# Patient Record
Sex: FEMALE | Race: WHITE | NOT HISPANIC OR LATINO | Employment: OTHER | URBAN - METROPOLITAN AREA
[De-identification: names, ages, dates, MRNs, and addresses within clinical notes are randomized per-mention and may not be internally consistent; named-entity substitution may affect disease eponyms.]

---

## 2018-02-21 ENCOUNTER — OFFICE VISIT (OUTPATIENT)
Dept: PODIATRY | Facility: CLINIC | Age: 83
End: 2018-02-21
Payer: COMMERCIAL

## 2018-02-21 VITALS
HEIGHT: 63 IN | DIASTOLIC BLOOD PRESSURE: 70 MMHG | SYSTOLIC BLOOD PRESSURE: 116 MMHG | BODY MASS INDEX: 25.34 KG/M2 | WEIGHT: 143 LBS

## 2018-02-21 DIAGNOSIS — M20.41 HAMMER TOES OF BOTH FEET: ICD-10-CM

## 2018-02-21 DIAGNOSIS — L84 CORNS AND CALLUS: ICD-10-CM

## 2018-02-21 DIAGNOSIS — L60.0 INGROWN NAIL OF GREAT TOE OF RIGHT FOOT: ICD-10-CM

## 2018-02-21 DIAGNOSIS — M79.671 PAIN IN BOTH FEET: Primary | ICD-10-CM

## 2018-02-21 DIAGNOSIS — M79.672 PAIN IN BOTH FEET: Primary | ICD-10-CM

## 2018-02-21 DIAGNOSIS — M20.42 HAMMER TOES OF BOTH FEET: ICD-10-CM

## 2018-02-21 PROCEDURE — 99203 OFFICE O/P NEW LOW 30 MIN: CPT | Performed by: PODIATRIST

## 2018-02-21 NOTE — PATIENT INSTRUCTIONS
Thank you for choosing [facility]!    DR. HUNTER'S CLINIC LOCATIONS:   MONDAY AM - [place] TUESDAY - [place]   [address] [address] [address] [address] [place] [id number] [phone] / [phone] / [phone]       WEDNESDAY - [place] FRIDAY PM - [place]   [address]   [place] [address] [place] [address] [phone] / [phone] / [phone]       SCHEDULE SURGERY: [phone]    APPOINTMENTS: [phone]    BILLING QUESTIONS: [phone]      FOOT CARE NURSES  If you are interested in having a foot care nurse come out to your   home, please call one of these contacts for more information:  [facility]  [phone] [facility]  [phone]   [facility]  [phone]  [place] [phone]  [facility]   [facility]  [phone]  At [facility] [phone]           CALLUS / CORNS / IPKs  When there is excessive friction or pressure on the skin, the body responds by making the skin thicker to protect the deeper structures from becoming exposed. While this works well to protect the deeper structures, the thickened skin can increase pressure and pain.    CALLUS: Flat, diffuse thickening are simple calluses and they are usually caused by friction. Often these are the result of rubbing on a shoe or going barefoot.    CORNS: Calluses with a central core between the toes are called corns. These result from prominent joints on adjacent toes rubbing together. Theses are a symptom of bone malalignment and will always recur unless the underlying bones are addressed surgically.    IPKs: Calluses with a central core on the ball of the foot are usually IPKs (intractable plantar keratosis). These are caused by excessive pressure from the metatarsals, the bones that make up the ball of the foot.  Often one of these bones is too long or too prominent.  Again, these will always recur unless the underlying bone issue is addressed. There is no cure for these. They will either go away by themselves, recur, or more could develop.    ROUTINE MAINTENANCE  1. File them down with a pumice stone or callus file a couple times a week.   2. An electric callus removing device. Amope Pedi Perfect Electronic Pedicure Foot File and Callus Remover can be a good option.   3. Lotion can be applied to soften the callus. A urea based cream such as Kersal or Vanicream or thicker cream with shea butter are good options.  4. Toe spacers or toe covers can be used for corns, gel pads can be used for other lesions on the bottom of the foot.   If there is a surgical pathology noted, such as a prominent bone, often this needs to be addressed surgically to minimize recurrence. However, sometimes the lesion simply migrates to another spot after surgery, so it is not a guaranteed cure.         HAMMERTOES  Hammertoe is a contracture (bending) of one or both joints of the second, third, fourth, or fifth (little) toes. This abnormal bending can put pressure on the toe when wearing shoes, causing problems to develop.  Hammertoes usually start out as mild deformities and get progressively worse over time. In the earlier stages, hammertoes are flexible and the symptoms can often be managed with noninvasive measures. But if left untreated, hammertoes can become more rigid and will not respond to non-surgical treatment.  Because of the progressive nature of hammertoes, they should receive early attention. Hammertoes never get better without some kind of intervention.  CAUSES  The most common cause of hammertoe is a muscle/tendon imbalance. This imbalance, which leads to a bending of the toe, results from mechanical (structural) changes in the foot that occur over time in some people.  Hammertoes may be aggravated by shoes that don t fit properly. A  hammertoe may result if a toe is too long and is forced into a cramped position when a tight shoe is worn.  Occasionally, hammertoe is the result of an earlier trauma to the toe. In some people, hammertoes are inherited.  SYMPTOMS  Pain or irritation of the affected toe when wearing shoes.   Corns and calluses (a buildup of skin) on the toe, between two toes, or on the ball of the foot. Corns are caused by constant friction against the shoe. They may be soft or hard, depending upon their location.   Inflammation, redness, or a burning sensation   Contracture of the toe   In more severe cases of hammertoe, open sores may form.   DIAGNOSIS  Although hammertoes are readily apparent, to arrive at a diagnosis the foot and ankle surgeon will obtain a thorough history of your symptoms and examine your foot. During the physical examination, the doctor may attempt to reproduce your symptoms by manipulating your foot and will study the contractures of the toes. In addition, the foot and ankle surgeon may take x-rays to determine the degree of the deformities and assess any changes that may have occurred.   Hammertoes are progressive   they don t go away by themselves and usually they will get worse over time. However, not all cases are alike   some hammertoes progress more rapidly than others. Once your foot and ankle surgeon has evaluated your hammertoes, a treatment plan can be developed that is suited to your needs.  NON-SURGICAL TREATMENT  There is a variety of treatment options for hammertoe. The treatment your foot and ankle surgeon selects will depend upon the severity of your hammertoe and other factors.  A number of non-surgical measures can be undertaken:  Padding corns and calluses. Your foot and ankle surgeon can provide or prescribe pads designed to shield corns from irritation. If you want to try over-the-counter pads, avoid the medicated types. Medicated pads are generally not recommended because they may  contain a small amount of acid that can be harmful. Consult your surgeon about this option.   Changes in shoewear. Avoid shoes with pointed toes, shoes that are too short, or shoes with high heels   conditions that can force your toe against the front of the shoe. Instead, choose comfortable shoes with a deep, roomy toe box and heels no higher than two inches.   Orthotic devices. A custom orthotic device placed in your shoe may help control the muscle/tendon imbalance.   Injection therapy. Corticosteroid injections are sometimes used to ease pain and inflammation caused by hammertoe.   Medications. Oral nonsteroidal anti-inflammatory drugs (NSAIDs), such as ibuprofen, may be recommended to reduce pain and inflammation.   Splinting/strapping. Splints or small straps may be applied by the surgeon to realign the bent toe.   Exercises:   1. The Toe Tap  Stand flat on the ground in your bare feet. Raise all of your toes up off the ground as high as you can. Then starting with the little toes, slowly press them down to the ground. After the big toes are on the ground, start over by raising all of them up off the ground again. This motion is similar to tapping your fingers on a desk. Repeat this ten times.     2. Interlocking your Fingers and Toes  Cross your right foot over your knee and place the fingers of your left hand between your toes. Squeeze your toes together, pinching your fingers between them. Spread the toes apart and squeeze them together again. Repeat this ten times then do the other foot. Like most exercises, this will get easier the more you do it. If you are having a lot of difficulty with this exercise, start with just your index finger between your first and second toe, then later add your middle finger between your second and third toes, and so on until you can fit all your fingers between your toes. Do this ten times on each foot. Eventually you will be able to spread your toes apart without using  your fingers.    3. Gripping the Floor   the floor by pressing the pads of your toes (not the tips) into the floor without curling your toes. Relax and repeat this ten times. If your shoes have the proper amount of depth, you should be able to do this with shoes on.    HAMMERTOE TOE SURGERY   Hammertoe surgery is complex. The surgical procedure is an attempt to help the toe lay in a better position. Nearly every structure in the toe will be cut including the tendons, ligaments, skin and bone. Hammertoes are a complex deformity and final toe position is difficult to predict. The only sure way to position a toe is to fuse all three digital joints. That will not happen as some degree of toe motion is needed for walking. The toe may not be completely reduced as the surrounding skin and other structures may not allow the toe to return to a normal position. The tendons on adjacent toes may need to be cut at the time of the original or subsequent surgeries, as interconnections exist between the toes. The toe may drift after surgery. Stiffness may develop leading to new areas of pressure.   Future shoe choices will be critical in allowing the surgery to provide comfort. The toes will still hurt if shoes rub. The original pain may also persist as other foot problems may be contributing to the current pain. The toe may or may not be pinned in place. External pins would require complete avoidance of water on the foot for six weeks. The pin would be removed about six weeks after the surgery. Strict attention to protection is critical. The pin could get bumped or loosen resulting in early removal. Removal might be necessary before the bone heals which would negatively affect the final surgical outcome and toe allignment.     TOE COVERS/CAPS            Body Mass Index (BMI)  Many things can cause foot and ankle problems. Foot structure, activity level, foot mechanics and injuries are common causes of pain.  One very important  issue that often goes unmentioned, is body weight. Extra weight can cause increased stress on muscles, ligaments, bones and tendons.  Sometimes just a few extra pounds is all it takes to put one over her/his threshold. Without reducing that stress, it can be difficult to alleviate pain. Some people are uncomfortable addressing this issue, but we feel it is important for you to think about it. As Foot &  Ankle specialists, our job is addressing the lower extremity problem and possible causes. Regarding extra body weight, we encourage patients to discuss diet and weight management plans with their primary care doctors. It is this team approach that gives you the best opportunity for pain relief and getting you back on your feet.

## 2018-02-21 NOTE — NURSING NOTE
"Chief Complaint   Patient presents with     Foot Problems     corn between the 4th and 5th toes on both feet the right foot is worse        Initial /70  Ht 5' 3.25\" (1.607 m)  Wt 143 lb (64.9 kg)  BMI 25.13 kg/m2 Estimated body mass index is 25.13 kg/(m^2) as calculated from the following:    Height as of this encounter: 5' 3.25\" (1.607 m).    Weight as of this encounter: 143 lb (64.9 kg).  Medication Reconciliation: complete   Dipesh Ruiz MA      "

## 2018-02-21 NOTE — LETTER
2/21/2018         RE: Carrol Diane  [address]   [facility] [address]        Dear Colleague,    Thank you for referring your patient, Carrol Diane, to the [facility]. Please see a copy of my visit note below.    PATIENT HISTORY:    Carrol Diane is a 85 year old female who presents to clinic for painful calluses and ingrown nail. She notes she can not reach her feet. Pain is 6/10 especially with compression socks on. Tried a corn remover which did not help. Would like them taken care of and would like the right great toenail cut back to help with pain.     Review of Systems:  Patient denies fever, chills, rash, wound, stiffness, limping, numbness, weakness, heart burn, blood in stool, chest pain with activity, calf pain when walking, shortness of breath with activity, chronic cough, easy bleeding/bruising, excessive thirst, fatigue, depression, anxiety.  Patient admits to swelling.     PAST MEDICAL HISTORY:   Past Medical History:   Diagnosis Date     Allergic rhinitis      Anxiety disorder      Arrhythmia     atrial fibrillation     Arthritis      Chest pain      Coronary artery disease      Depression      Disc disorder of lumbar region      Diverticulosis      Dyspepsia      Gastro-oesophageal reflux disease      Glaucoma      Heart murmur     mitral valve disorder     Hemorrhoids      History of angina      [place] (hard of hearing)      Hypercholesterolemia      Hypertension      Osteopenia      Pelvic floor dysfunction      Peripheral neuropathy      Radiculopathy of lumbar region      Sinus node dysfunction (H)      Thyroid disease      Type 2 diabetes mellitus without complications (H)         PAST SURGICAL HISTORY:   Past Surgical History:   Procedure Laterality Date     APPENDECTOMY       ARTHROPLASTY HIP       C LUMBAR SPINE FUSION,ANTER APPRCH       COLONOSCOPY       HYSTERECTOMY RADICAL       PHACOEMULSIFICATION CLEAR CORNEA WITH STANDARD INTRAOCULAR LENS IMPLANT  2/13/2014     Procedure: PHACOEMULSIFICATION CLEAR CORNEA WITH STANDARD INTRAOCULAR LENS IMPLANT;   COMPLEX  LEFT PHACOEMULSIFICATION CLEAR CORNEA WITH STANDARD INTRAOCULAR LENS IMPLANT  ;  Surgeon: Walter Mauricio MD;  Location: [facility]     PHACOEMULSIFICATION CLEAR CORNEA WITH STANDARD INTRAOCULAR LENS IMPLANT  2/25/2014    Procedure: PHACOEMULSIFICATION CLEAR CORNEA WITH STANDARD INTRAOCULAR LENS IMPLANT;    COMPLEX  RIGHT PHACOEMULSIFICATION CLEAR CORNEA WITH STANDARD INTRAOCULAR LENS IMPLANT (TRYPAN, MALYUGIN RING);  Surgeon: Walter Mauricio MD;  Location: [facility]        MEDICATIONS:   Current Outpatient Prescriptions:      saccharomyces boulardii (FLORASTOR) 250 MG capsule, Take 250 mg by mouth once, Disp: , Rfl:      conjugated estrogens (PREMARIN) vaginal cream, Place 0.5 g vaginally three times a week, Disp: 30 g, Rfl: 12     Escitalopram Oxalate (LEXAPRO PO), Take 10 mg by mouth daily, Disp: , Rfl:      fluticasone (FLONASE) 50 MCG/ACT nasal spray, Spray 2 sprays into both nostrils daily, Disp: , Rfl:      Isosorbide Mononitrate (IMDUR PO), Take 60 mg by mouth daily, Disp: , Rfl:      loratadine (CLARITIN) 10 MG tablet, Take 10 mg by mouth daily, Disp: , Rfl:      NIFEdipine (PROCARDIA XL PO), Take 90 mg by mouth daily, Disp: , Rfl:      Nitroglycerin (NITROSTAT SL), Place 0.4 mg under the tongue every 5 minutes as needed, Disp: , Rfl:      Pantoprazole Sodium (PROTONIX PO), Take 40 mg by mouth daily, Disp: , Rfl:      dorzolamide-timolol (COSOPT) 2-0.5 % ophthalmic solution, Place 1 drop into both eyes 2 times daily, Disp: , Rfl:      aspirin 81 MG tablet, Take 1 tablet by mouth daily., Disp: , Rfl:      Calcium-Vitamin D (CALCIUM + D PO), Take  by mouth., Disp: , Rfl:      Cholecalciferol (VITAMIN D3) 1000 UNIT TABS, Take  by mouth., Disp: , Rfl:      hydrocortisone 2.5 % cream, Apply 0.5 inches topically 2 times daily., Disp: , Rfl:      senna (SENOKOT) 8.6 MG tablet, Take 1 tablet by mouth daily.,  "Disp: , Rfl:      tramadol (ULTRAM) 50 MG tablet, Take 50 mg by mouth every 6 hours as needed., Disp: , Rfl:      CENTRUM SILVER OR, None Entered, Disp: , Rfl:      TYLENOL ARTHRITIS PAIN OR, prn, Disp: , Rfl:      LISINOPRIL 40 MG OR TABS, 1 TABLET DAILY, Disp: , Rfl:      LOVASTATIN 40 MG OR TABS, 1 TABLET DAILY AT bedtime, Disp: , Rfl:      LEVOTHROID 75 MCG OR TABS, 1 TABLET DAILY, Disp: , Rfl:      XALATAN 0.005 % OP SOLN, 1 drop at bdtime daily, Disp: , Rfl:      GABAPENTIN 300 MG OR CAPS, 1 CAPSULE 3 TIMES DAILY, Disp: , Rfl:      ALLERGIES:    Allergies   Allergen Reactions     Coumadin [Warfarin]      Dyazide [Hydrochlorothiazide W/Triamterene]      Monistat 1 [Tioconazole]      Burning and itching     Seasonale      Verapamil         SOCIAL HISTORY:   Social History     Social History     Marital status:      Spouse name: N/A     Number of children: N/A     Years of education: N/A     Occupational History     Not on file.     Social History Main Topics     Smoking status: Never Smoker     Smokeless tobacco: Never Used     Alcohol use No      Comment: socially     Drug use: No     Sexual activity: Not on file     Other Topics Concern     Not on file     Social History Narrative     No narrative on file        FAMILY HISTORY:   Family History   Problem Relation Age of Onset     DIABETES Daughter         EXAM:Vitals: /70  Ht 5' 3.25\" (1.607 m)  Wt 143 lb (64.9 kg)  BMI 25.13 kg/m2    General appearance: Patient is alert and fully cooperative with history & exam.  No sign of distress is noted during the visit.     Psychiatric: Affect is pleasant & appropriate.  Patient appears motivated to improve health.     Respiratory: Breathing is regular & unlabored while sitting.     HEENT: Hearing is intact to spoken word.  Speech is clear.  No gross evidence of visual impairment that would impact ambulation.     Dermatologic: localized hyperkeratotic lesion to medial right 5th proximal interphalangeal " joint and lateral right 4th proximal interphalangeal joint. No open lesions or signs of infection noted. Medial border of right great toenail is incurvated. Pain on palpation.      Vascular: DP & PT pulses are intact & regular bilaterally.  edema and varicosities noted.  CFT and skin temperature is normal to both lower extremities.     Neurologic: Lower extremity sensation is intact to light touch.  No evidence of weakness or contracture in the lower extremities.  No evidence of neuropathy.     Musculoskeletal: Patient is ambulatory without assistive device or brace.  Rigid contracture of toes 2-5 with overlapping 5th toes bilateral.      ASSESSMENT:    Pain in both feet  Hammer toes of both feet  Corns and callus  Ingrown nail of great toe of right foot       PLAN:  Reviewed patient's chart in [facility]. Reviewed and discussed causes of hammertoes with patient.  Explained that this can be caused by an overpowering of muscles or by the way we walk.  Discussed conservative treatments such as orthotics, pads, shoe gear.  Explained that sometimes the flexor tendons can be cut to try and straighten the toe and reduce rubbing. This is normally done in office and patient is weight bearing in postop she for 1-2 weeks.  We also discussed surgical intervention to remove the joint and possibly fuse the toe.  Normally patient has a pin sticking out of the toe for about 6 weeks and can not get the foot wet. Patient would have to be minimal weight bearing in cam boot.      Discussed causes of keratomas.  They are due to areas of increase friction.  Hammertoes can create these as they put more pressure to the metatarsal head.  Discussed treatments such as using foot file, pumice stone, metatarsal pads, orthotics, and not walking barefoot.     She is going to use pumice stone and toe covers to pad area. Not interested in surgery.     The potential causes and nature of an ingrown toenail were discussed with the patient.  We reviewed the  natural history/prognosis of the condition and potential risks if no treatment is provided.      Treatment options discussed included conservative management (oral antibiotics, soaking of foot, adequate width shoes)  as well as surgical management (partial or total nail removal).  The pros and cons of both forms of treatment were reviewed.      After thorough discussion and answering all questions, the patient elected to to have the nail border just cut back. Did not want avulsion procedure. Was given information on where to get nail and callus care. Follow up as needed. .        Divine Duggan DPM, Podiatry/Foot and Ankle Surgery    Weight management plan: Patient was referred to their PCP to discuss a diet and exercise plan.      Again, thank you for allowing me to participate in the care of your patient.        Sincerely,        Divine Duggan DPM, Podiatry/Foot and Ankle Surgery

## 2018-02-21 NOTE — MR AVS SNAPSHOT
After Visit Summary   2/21/2018    Carrol Diane    MRN: [id number]           Patient Information     Date Of Birth          6/5/1932        Visit Information        Provider Department      2/21/2018 10:15 AM Divine Duggan DPM, Podiatry/Foot and Ankle Surgery [facility]        Care Instructions    Thank you for choosing [facility]!    DR. DUGGAN'S CLINIC LOCATIONS:   MONDAY AM - [place] TUESDAY - [place]   [address] [address] [place] [address] [place] [address]   [phone] / [phone] / [phone]       WEDNESDAY - [place] FRIDAY PM - [place]   [address]   [place] [address] [place], [place] [address] [phone] / [phone] / [phone]       SCHEDULE SURGERY: [phone]    APPOINTMENTS: [phone]    BILLING QUESTIONS: [phone]      FOOT CARE NURSES  If you are interested in having a foot care nurse come out to your   home, please call one of these contacts for more information:  [facility]  [phone] [facility]  [phone]   [facility]  [phone]  [facility] [phone]  [facility]  [phone]  [facility] [phone]           CALLUS / CORNS / IPKs  When there is excessive friction or pressure on the skin, the body responds by making the skin thicker to protect the deeper structures from becoming exposed. While this works well to protect the deeper structures, the thickened skin can increase pressure and pain.    CALLUS: Flat, diffuse thickening are simple calluses and they are usually caused by friction. Often these are the result of rubbing on a shoe or going barefoot.    CORNS: Calluses with a central core between the toes are called corns. These result from prominent joints on adjacent toes rubbing together. Theses  are a symptom of bone malalignment and will always recur unless the underlying bones are addressed surgically.    IPKs: Calluses with a central core on the ball of the foot are usually IPKs (intractable plantar keratosis). These are caused by excessive pressure from the metatarsals, the bones that make up the ball of the foot. Often one of these bones is too long or too prominent.  Again, these will always recur unless the underlying bone issue is addressed. There is no cure for these. They will either go away by themselves, recur, or more could develop.    ROUTINE MAINTENANCE  1. File them down with a pumice stone or callus file a couple times a week.   2. An electric callus removing device. Amope Pedi Perfect Electronic Pedicure Foot File and Callus Remover can be a good option.   3. Lotion can be applied to soften the callus. A urea based cream such as Kersal or Vanicream or thicker cream with shea butter are good options.  4. Toe spacers or toe covers can be used for corns, gel pads can be used for other lesions on the bottom of the foot.   If there is a surgical pathology noted, such as a prominent bone, often this needs to be addressed surgically to minimize recurrence. However, sometimes the lesion simply migrates to another spot after surgery, so it is not a guaranteed cure.         HAMMERTOES  Hammertoe is a contracture (bending) of one or both joints of the second, third, fourth, or fifth (little) toes. This abnormal bending can put pressure on the toe when wearing shoes, causing problems to develop.  Hammertoes usually start out as mild deformities and get progressively worse over time. In the earlier stages, hammertoes are flexible and the symptoms can often be managed with noninvasive measures. But if left untreated, hammertoes can become more rigid and will not respond to non-surgical treatment.  Because of the progressive nature of hammertoes, they should receive early attention. Hammertoes never get  better without some kind of intervention.  CAUSES  The most common cause of hammertoe is a muscle/tendon imbalance. This imbalance, which leads to a bending of the toe, results from mechanical (structural) changes in the foot that occur over time in some people.  Hammertoes may be aggravated by shoes that don t fit properly. A hammertoe may result if a toe is too long and is forced into a cramped position when a tight shoe is worn.  Occasionally, hammertoe is the result of an earlier trauma to the toe. In some people, hammertoes are inherited.  SYMPTOMS  Pain or irritation of the affected toe when wearing shoes.   Corns and calluses (a buildup of skin) on the toe, between two toes, or on the ball of the foot. Corns are caused by constant friction against the shoe. They may be soft or hard, depending upon their location.   Inflammation, redness, or a burning sensation   Contracture of the toe   In more severe cases of hammertoe, open sores may form.   DIAGNOSIS  Although hammertoes are readily apparent, to arrive at a diagnosis the foot and ankle surgeon will obtain a thorough history of your symptoms and examine your foot. During the physical examination, the doctor may attempt to reproduce your symptoms by manipulating your foot and will study the contractures of the toes. In addition, the foot and ankle surgeon may take x-rays to determine the degree of the deformities and assess any changes that may have occurred.   Hammertoes are progressive - they don t go away by themselves and usually they will get worse over time. However, not all cases are alike - some hammertoes progress more rapidly than others. Once your foot and ankle surgeon has evaluated your hammertoes, a treatment plan can be developed that is suited to your needs.  NON-SURGICAL TREATMENT  There is a variety of treatment options for hammertoe. The treatment your foot and ankle surgeon selects will depend upon the severity of your hammertoe and other  factors.  A number of non-surgical measures can be undertaken:  Padding corns and calluses. Your foot and ankle surgeon can provide or prescribe pads designed to shield corns from irritation. If you want to try over-the-counter pads, avoid the medicated types. Medicated pads are generally not recommended because they may contain a small amount of acid that can be harmful. Consult your surgeon about this option.   Changes in shoewear. Avoid shoes with pointed toes, shoes that are too short, or shoes with high heels - conditions that can force your toe against the front of the shoe. Instead, choose comfortable shoes with a deep, roomy toe box and heels no higher than two inches.   Orthotic devices. A custom orthotic device placed in your shoe may help control the muscle/tendon imbalance.   Injection therapy. Corticosteroid injections are sometimes used to ease pain and inflammation caused by hammertoe.   Medications. Oral nonsteroidal anti-inflammatory drugs (NSAIDs), such as ibuprofen, may be recommended to reduce pain and inflammation.   Splinting/strapping. Splints or small straps may be applied by the surgeon to realign the bent toe.   Exercises:   1. The Toe Tap  Stand flat on the ground in your bare feet. Raise all of your toes up off the ground as high as you can. Then starting with the little toes, slowly press them down to the ground. After the big toes are on the ground, start over by raising all of them up off the ground again. This motion is similar to tapping your fingers on a desk. Repeat this ten times.     2. Interlocking your Fingers and Toes  Cross your right foot over your knee and place the fingers of your left hand between your toes. Squeeze your toes together, pinching your fingers between them. Spread the toes apart and squeeze them together again. Repeat this ten times then do the other foot. Like most exercises, this will get easier the more you do it. If you are having a lot of difficulty  with this exercise, start with just your index finger between your first and second toe, then later add your middle finger between your second and third toes, and so on until you can fit all your fingers between your toes. Do this ten times on each foot. Eventually you will be able to spread your toes apart without using your fingers.    3. Gripping the Floor   the floor by pressing the pads of your toes (not the tips) into the floor without curling your toes. Relax and repeat this ten times. If your shoes have the proper amount of depth, you should be able to do this with shoes on.    HAMMERTOE TOE SURGERY   Hammertoe surgery is complex. The surgical procedure is an attempt to help the toe lay in a better position. Nearly every structure in the toe will be cut including the tendons, ligaments, skin and bone. Hammertoes are a complex deformity and final toe position is difficult to predict. The only sure way to position a toe is to fuse all three digital joints. That will not happen as some degree of toe motion is needed for walking. The toe may not be completely reduced as the surrounding skin and other structures may not allow the toe to return to a normal position. The tendons on adjacent toes may need to be cut at the time of the original or subsequent surgeries, as interconnections exist between the toes. The toe may drift after surgery. Stiffness may develop leading to new areas of pressure.   Future shoe choices will be critical in allowing the surgery to provide comfort. The toes will still hurt if shoes rub. The original pain may also persist as other foot problems may be contributing to the current pain. The toe may or may not be pinned in place. External pins would require complete avoidance of water on the foot for six weeks. The pin would be removed about six weeks after the surgery. Strict attention to protection is critical. The pin could get bumped or loosen resulting in early removal. Removal  might be necessary before the bone heals which would negatively affect the final surgical outcome and toe allignment.     TOE COVERS/CAPS            Body Mass Index (BMI)  Many things can cause foot and ankle problems. Foot structure, activity level, foot mechanics and injuries are common causes of pain.  One very important issue that often goes unmentioned, is body weight. Extra weight can cause increased stress on muscles, ligaments, bones and tendons.  Sometimes just a few extra pounds is all it takes to put one over her/his threshold. Without reducing that stress, it can be difficult to alleviate pain. Some people are uncomfortable addressing this issue, but we feel it is important for you to think about it. As Foot &  Ankle specialists, our job is addressing the lower extremity problem and possible causes. Regarding extra body weight, we encourage patients to discuss diet and weight management plans with their primary care doctors. It is this team approach that gives you the best opportunity for pain relief and getting you back on your feet.                  Follow-ups after your visit        Who to contact     If you have questions or need follow up information about today's clinic visit or your schedule please contact [facility] directly at [phone].  Normal or non-critical lab and imaging results will be communicated to you by [employer], letter or phone within 4 business days after the clinic has received the results. If you do not hear from us within 7 days, please contact the clinic through [employer] or phone. If you have a critical or abnormal lab result, we will notify you by phone as soon as possible.  Submit refill requests through [facility] [employer] or call your pharmacy and they will forward the refill request to us. Please allow 3 business days for your refill to be completed.          Additional Information About Your Visit        [facility] [employer] Information     [facility] [employer] lets you send messages to your  "doctor, view your test results, renew your prescriptions, schedule appointments and more. To sign up, go to [email or web address] . Click on \"Log in\" on the left side of the screen, which will take you to the Welcome page. Then click on \"Sign up Now\" on the right side of the page.     You will be asked to enter the access code listed below, as well as some personal information. Please follow the directions to create your username and password.     Your access code is: [email or web address]  Expires: 2018 10:48 AM     Your access code will  in 90 days. If you need help or a new code, please call your [place] clinic or [phone].        Care EveryWhere ID     This is your Care EveryWhere ID. This could be used by other organizations to access your [facility] medical records  [id number]        Your Vitals Were     Height BMI (Body Mass Index)                5' 3.25\" (1.607 m) 25.13 kg/m2           Blood Pressure from Last 3 Encounters:   18 116/70   14 129/77   14 127/77    Weight from Last 3 Encounters:   18 143 lb (64.9 kg)   14 143 lb (64.9 kg)   14 143 lb 3.2 oz (65 kg)              Today, you had the following     No orders found for display       Primary Care Provider Fax #    Physician No Ref-Primary [phone]       No address on file        Equal Access to Services     BERYL MELENDREZ : Hadii elsie booo Sojocelyneali, waaxda luqadaha, qaybta kaalmada adeegyajeannine, magdalena kim . So [facility] [phone].    ATENCIÓN: Si habla español, tiene a medina disposición servicios gratuitos de asistencia lingüística. Llame al [phone].    We comply with applicable federal civil rights laws and [place] laws. We do not discriminate on the basis of race, color, national origin, age, disability, sex, sexual orientation, or gender identity.            Thank you!     Thank you for choosing [facility] [place]  for your care. Our goal is always to provide " you with excellent care. Hearing back from our patients is one way we can continue to improve our services. Please take a few minutes to complete the written survey that you may receive in the mail after your visit with us. Thank you!             Your Updated Medication List - Protect others around you: Learn how to safely use, store and throw away your medicines at www.disposemymeds.org.          This list is accurate as of 2/21/18 10:48 AM.  Always use your most recent med list.                   Brand Name Dispense Instructions for use Diagnosis    aspirin 81 MG tablet      Take 1 tablet by mouth daily.        CALCIUM + D PO      Take  by mouth.        CENTRUM SILVER PO      None Entered        cholecalciferol 1000 UNIT tablet    vitamin D3     Take  by mouth.        conjugated estrogens cream    PREMARIN    30 g    Place 0.5 g vaginally three times a week    Atrophic vulvovaginitis       dorzolamide-timolol 2-0.5 % ophthalmic solution    COSOPT     Place 1 drop into both eyes 2 times daily        fluticasone 50 MCG/ACT spray    FLONASE     Spray 2 sprays into both nostrils daily        gabapentin 300 MG capsule    NEURONTIN     1 CAPSULE 3 TIMES DAILY        hydrocortisone 2.5 % cream      Apply 0.5 inches topically 2 times daily.        IMDUR PO      Take 60 mg by mouth daily        LEVOTHROID 75 MCG tablet   Generic drug:  levothyroxine      1 TABLET DAILY        LEXAPRO PO      Take 10 mg by mouth daily        lisinopril 40 MG tablet    PRINIVIL/ZESTRIL     1 TABLET DAILY        loratadine 10 MG tablet    CLARITIN     Take 10 mg by mouth daily        lovastatin 40 MG tablet    MEVACOR     1 TABLET DAILY AT bedtime        NITROSTAT SL      Place 0.4 mg under the tongue every 5 minutes as needed        PROCARDIA XL PO      Take 90 mg by mouth daily        PROTONIX PO      Take 40 mg by mouth daily        saccharomyces boulardii 250 MG capsule    FLORASTOR     Take 250 mg by mouth once        SENOKOT 8.6 MG  tablet   Generic drug:  senna      Take 1 tablet by mouth daily.        TYLENOL ARTHRITIS PAIN PO      prn        ULTRAM 50 MG tablet   Generic drug:  traMADol      Take 50 mg by mouth every 6 hours as needed.        XALATAN 0.005 % ophthalmic solution   Generic drug:  latanoprost      1 drop at bdtime daily

## 2018-02-21 NOTE — PROGRESS NOTES
PATIENT HISTORY:    Carrol Diane is a 85 year old female who presents to clinic for painful calluses and ingrown nail. She notes she can not reach her feet. Pain is 6/10 especially with compression socks on. Tried a corn remover which did not help. Would like them taken care of and would like the right great toenail cut back to help with pain.     Review of Systems:  Patient denies fever, chills, rash, wound, stiffness, limping, numbness, weakness, heart burn, blood in stool, chest pain with activity, calf pain when walking, shortness of breath with activity, chronic cough, easy bleeding/bruising, excessive thirst, fatigue, depression, anxiety.  Patient admits to swelling.     PAST MEDICAL HISTORY:   Past Medical History:   Diagnosis Date     Allergic rhinitis      Anxiety disorder      Arrhythmia     atrial fibrillation     Arthritis      Chest pain      Coronary artery disease      Depression      Disc disorder of lumbar region      Diverticulosis      Dyspepsia      Gastro-oesophageal reflux disease      Glaucoma      Heart murmur     mitral valve disorder     Hemorrhoids      History of angina      [place] (hard of hearing)      Hypercholesterolemia      Hypertension      Osteopenia      Pelvic floor dysfunction      Peripheral neuropathy      Radiculopathy of lumbar region      Sinus node dysfunction (H)      Thyroid disease      Type 2 diabetes mellitus without complications (H)         PAST SURGICAL HISTORY:   Past Surgical History:   Procedure Laterality Date     APPENDECTOMY       ARTHROPLASTY HIP       C LUMBAR SPINE FUSION,ANTER APPRCH       COLONOSCOPY       HYSTERECTOMY RADICAL       PHACOEMULSIFICATION CLEAR CORNEA WITH STANDARD INTRAOCULAR LENS IMPLANT  2/13/2014    Procedure: PHACOEMULSIFICATION CLEAR CORNEA WITH STANDARD INTRAOCULAR LENS IMPLANT;   COMPLEX  LEFT PHACOEMULSIFICATION CLEAR CORNEA WITH STANDARD INTRAOCULAR LENS IMPLANT  ;  Surgeon: Walter Mauricio MD;  Location: [facility]      PHACOEMULSIFICATION CLEAR CORNEA WITH STANDARD INTRAOCULAR LENS IMPLANT  2/25/2014    Procedure: PHACOEMULSIFICATION CLEAR CORNEA WITH STANDARD INTRAOCULAR LENS IMPLANT;    COMPLEX  RIGHT PHACOEMULSIFICATION CLEAR CORNEA WITH STANDARD INTRAOCULAR LENS IMPLANT (TRYPAN, MALYUGIN RING);  Surgeon: Walter Mauricio MD;  Location: [facility]        MEDICATIONS:   Current Outpatient Prescriptions:      saccharomyces boulardii (FLORASTOR) 250 MG capsule, Take 250 mg by mouth once, Disp: , Rfl:      conjugated estrogens (PREMARIN) vaginal cream, Place 0.5 g vaginally three times a week, Disp: 30 g, Rfl: 12     Escitalopram Oxalate (LEXAPRO PO), Take 10 mg by mouth daily, Disp: , Rfl:      fluticasone (FLONASE) 50 MCG/ACT nasal spray, Spray 2 sprays into both nostrils daily, Disp: , Rfl:      Isosorbide Mononitrate (IMDUR PO), Take 60 mg by mouth daily, Disp: , Rfl:      loratadine (CLARITIN) 10 MG tablet, Take 10 mg by mouth daily, Disp: , Rfl:      NIFEdipine (PROCARDIA XL PO), Take 90 mg by mouth daily, Disp: , Rfl:      Nitroglycerin (NITROSTAT SL), Place 0.4 mg under the tongue every 5 minutes as needed, Disp: , Rfl:      Pantoprazole Sodium (PROTONIX PO), Take 40 mg by mouth daily, Disp: , Rfl:      dorzolamide-timolol (COSOPT) 2-0.5 % ophthalmic solution, Place 1 drop into both eyes 2 times daily, Disp: , Rfl:      aspirin 81 MG tablet, Take 1 tablet by mouth daily., Disp: , Rfl:      Calcium-Vitamin D (CALCIUM + D PO), Take  by mouth., Disp: , Rfl:      Cholecalciferol (VITAMIN D3) 1000 UNIT TABS, Take  by mouth., Disp: , Rfl:      hydrocortisone 2.5 % cream, Apply 0.5 inches topically 2 times daily., Disp: , Rfl:      senna (SENOKOT) 8.6 MG tablet, Take 1 tablet by mouth daily., Disp: , Rfl:      tramadol (ULTRAM) 50 MG tablet, Take 50 mg by mouth every 6 hours as needed., Disp: , Rfl:      CENTRUM SILVER OR, None Entered, Disp: , Rfl:      TYLENOL ARTHRITIS PAIN OR, prn, Disp: , Rfl:      LISINOPRIL 40 MG OR TABS,  "1 TABLET DAILY, Disp: , Rfl:      LOVASTATIN 40 MG OR TABS, 1 TABLET DAILY AT bedtime, Disp: , Rfl:      LEVOTHROID 75 MCG OR TABS, 1 TABLET DAILY, Disp: , Rfl:      XALATAN 0.005 % OP SOLN, 1 drop at bdtime daily, Disp: , Rfl:      GABAPENTIN 300 MG OR CAPS, 1 CAPSULE 3 TIMES DAILY, Disp: , Rfl:      ALLERGIES:    Allergies   Allergen Reactions     Coumadin [Warfarin]      Dyazide [Hydrochlorothiazide W/Triamterene]      Monistat 1 [Tioconazole]      Burning and itching     Seasonale      Verapamil         SOCIAL HISTORY:   Social History     Social History     Marital status:      Spouse name: N/A     Number of children: N/A     Years of education: N/A     Occupational History     Not on file.     Social History Main Topics     Smoking status: Never Smoker     Smokeless tobacco: Never Used     Alcohol use No      Comment: socially     Drug use: No     Sexual activity: Not on file     Other Topics Concern     Not on file     Social History Narrative     No narrative on file        FAMILY HISTORY:   Family History   Problem Relation Age of Onset     DIABETES Daughter         EXAM:Vitals: /70  Ht 5' 3.25\" (1.607 m)  Wt 143 lb (64.9 kg)  BMI 25.13 kg/m2    General appearance: Patient is alert and fully cooperative with history & exam.  No sign of distress is noted during the visit.     Psychiatric: Affect is pleasant & appropriate.  Patient appears motivated to improve health.     Respiratory: Breathing is regular & unlabored while sitting.     HEENT: Hearing is intact to spoken word.  Speech is clear.  No gross evidence of visual impairment that would impact ambulation.     Dermatologic: localized hyperkeratotic lesion to medial right 5th proximal interphalangeal joint and lateral right 4th proximal interphalangeal joint. No open lesions or signs of infection noted. Medial border of right great toenail is incurvated. Pain on palpation.      Vascular: DP & PT pulses are intact & regular bilaterally.  " edema and varicosities noted.  CFT and skin temperature is normal to both lower extremities.     Neurologic: Lower extremity sensation is intact to light touch.  No evidence of weakness or contracture in the lower extremities.  No evidence of neuropathy.     Musculoskeletal: Patient is ambulatory without assistive device or brace.  Rigid contracture of toes 2-5 with overlapping 5th toes bilateral.      ASSESSMENT:    Pain in both feet  Hammer toes of both feet  Corns and callus  Ingrown nail of great toe of right foot       PLAN:  Reviewed patient[facility] in [facility]. Reviewed and discussed causes of hammertoes with patient.  Explained that this can be caused by an overpowering of muscles or by the way we walk.  Discussed conservative treatments such as orthotics, pads, shoe gear.  Explained that sometimes the flexor tendons can be cut to try and straighten the toe and reduce rubbing. This is normally done in office and patient is weight bearing in postop she for 1-2 weeks.  We also discussed surgical intervention to remove the joint and possibly fuse the toe.  Normally patient has a pin sticking out of the toe for about 6 weeks and can not get the foot wet. Patient would have to be minimal weight bearing in cam boot.      Discussed causes of keratomas.  They are due to areas of increase friction.  Hammertoes can create these as they put more pressure to the metatarsal head.  Discussed treatments such as using foot file, pumice stone, metatarsal pads, orthotics, and not walking barefoot.     She is going to use pumice stone and toe covers to pad area. Not interested in surgery.     The potential causes and nature of an ingrown toenail were discussed with the patient.  We reviewed the natural history/prognosis of the condition and potential risks if no treatment is provided.      Treatment options discussed included conservative management (oral antibiotics, soaking of foot, adequate width shoes)  as well as surgical  management (partial or total nail removal).  The pros and cons of both forms of treatment were reviewed.      After thorough discussion and answering all questions, the patient elected to to have the nail border just cut back. Did not want avulsion procedure. Was given information on where to get nail and callus care. Follow up as needed. .        Divine Duggan DPM, Podiatry/Foot and Ankle Surgery    Weight management plan: Patient was referred to their PCP to discuss a diet and exercise plan.

## 2018-06-07 ENCOUNTER — OFFICE VISIT (OUTPATIENT)
Dept: FAMILY MEDICINE | Facility: CLINIC | Age: 83
End: 2018-06-07
Payer: COMMERCIAL

## 2018-06-07 VITALS
HEART RATE: 94 BPM | DIASTOLIC BLOOD PRESSURE: 62 MMHG | RESPIRATION RATE: 16 BRPM | WEIGHT: 146.8 LBS | SYSTOLIC BLOOD PRESSURE: 104 MMHG | TEMPERATURE: 97.5 F | OXYGEN SATURATION: 95 % | HEIGHT: 64 IN | BODY MASS INDEX: 25.06 KG/M2

## 2018-06-07 DIAGNOSIS — S81.812A LACERATION OF LEFT LOWER EXTREMITY, INITIAL ENCOUNTER: Primary | ICD-10-CM

## 2018-06-07 PROCEDURE — 99213 OFFICE O/P EST LOW 20 MIN: CPT | Performed by: NURSE PRACTITIONER

## 2018-06-07 NOTE — PATIENT INSTRUCTIONS
Use bacitracin once daily.  No need for vaseline.  Leave this area open to the air.  No more bandages.  If the redness spreads outside of the marked area please seek follow up care.

## 2018-06-07 NOTE — MR AVS SNAPSHOT
"              After Visit Summary   6/7/2018    Carrol Diane    MRN: [id number]           Patient Information     Date Of Birth          6/5/1932        Visit Information        Provider Department      6/7/2018 8:40 AM Chani Sierra Ra, APRN CNP [facility]        Care Instructions    Use bacitracin once daily.  No need for vaseline.  Leave this area open to the air.  No more bandages.  If the redness spreads outside of the marked area please seek follow up care.          Follow-ups after your visit        Follow-up notes from your care team     Return in about 3 months (around 9/7/2018) for follow up with primary care provider.      Who to contact     If you have questions or need follow up information about today's clinic visit or your schedule please contact [facility] directly at [phone].  Normal or non-critical lab and imaging results will be communicated to you by MyChart, letter or phone within 4 business days after the clinic has received the results. If you do not hear from us within 7 days, please contact the clinic through MyChart or phone. If you have a critical or abnormal lab result, we will notify you by phone as soon as possible.  Submit refill requests through [employer] or call your pharmacy and they will forward the refill request to us. Please allow 3 business days for your refill to be completed.          Additional Information About Your Visit        Care EveryWhere ID     This is your Care EveryWhere ID. This could be used by other organizations to access your [place] medical records  [id number]        Your Vitals Were     Pulse Temperature Respirations Height Pulse Oximetry BMI (Body Mass Index)    94 97.5  F (36.4  C) (Oral) 16 5' 3.5\" (1.613 m) 95% 25.6 kg/m2       Blood Pressure from Last 3 Encounters:   06/07/18 104/62   02/21/18 116/70   02/25/14 129/77    Weight from Last 3 Encounters:   06/07/18 146 lb 12.8 oz (66.6 kg)   02/21/18 143 lb (64.9 " kg)   02/25/14 143 lb (64.9 kg)              Today, you had the following     No orders found for display       Primary Care Provider Fax #    Physician No Ref-Primary [phone]       No address on file        Equal Access to Services     JAH PARVIN : Violeta elsie greene kristin Irvin, wasamirada luqadaha, qaybta kaalmada chapo, magdalena chauvinod blanton. So [facility] [phone].    ATENCIÓN: Si habla español, tiene a medina disposición servicios gratuitos de asistencia lingüística. Llame al [phone].    We comply with applicable federal civil rights laws and [place] laws. We do not discriminate on the basis of race, color, national origin, age, disability, sex, sexual orientation, or gender identity.            Thank you!     Thank you for choosing [facility]  for your care. Our goal is always to provide you with excellent care. Hearing back from our patients is one way we can continue to improve our services. Please take a few minutes to complete the written survey that you may receive in the mail after your visit with us. Thank you!             Your Updated Medication List - Protect others around you: Learn how to safely use, store and throw away your medicines at www.disposemymeds.org.          This list is accurate as of 6/7/18  9:15 AM.  Always use your most recent med list.                   Brand Name Dispense Instructions for use Diagnosis    aspirin 81 MG tablet      Take 1 tablet by mouth daily.        CALCIUM + D PO      Take  by mouth.        CENTRUM SILVER PO      None Entered        cholecalciferol 1000 UNIT tablet    vitamin D3     Take  by mouth.        dorzolamide-timolol 2-0.5 % ophthalmic solution    COSOPT     Place 1 drop into both eyes 2 times daily        fluticasone 50 MCG/ACT spray    FLONASE     Spray 2 sprays into both nostrils daily        gabapentin 300 MG capsule    NEURONTIN     1 CAPSULE 3 TIMES DAILY        hydrocortisone 2.5 % cream      Apply 0.5  inches topically 2 times daily.        IMDUR PO      Take 60 mg by mouth daily        LEVOTHROID 75 MCG tablet   Generic drug:  levothyroxine      1 TABLET DAILY        LEXAPRO PO      Take 10 mg by mouth daily        lisinopril 40 MG tablet    PRINIVIL/ZESTRIL     1 TABLET DAILY        loratadine 10 MG tablet    CLARITIN     Take 10 mg by mouth daily        lovastatin 40 MG tablet    MEVACOR     1 TABLET DAILY AT bedtime        NITROSTAT SL      Place 0.4 mg under the tongue every 5 minutes as needed        PROCARDIA XL PO      Take 90 mg by mouth daily        PROTONIX PO      Take 40 mg by mouth daily        saccharomyces boulardii 250 MG capsule    FLORASTOR     Take 250 mg by mouth once        SENOKOT 8.6 MG tablet   Generic drug:  senna      Take 1 tablet by mouth daily.        TYLENOL ARTHRITIS PAIN PO      prn        ULTRAM 50 MG tablet   Generic drug:  traMADol      Take 50 mg by mouth every 6 hours as needed.        XALATAN 0.005 % ophthalmic solution   Generic drug:  latanoprost      1 drop at bdtime daily

## 2018-06-07 NOTE — PROGRESS NOTES
SUBJECTIVE:   Carrol Diane is a 86 year old female who presents to clinic today for the following health issues:      wound      Duration: 1 week    Description (location/character/radiation): wound on lower left leg    Intensity:  moderate    Accompanying signs and symptoms: redness and pain     History (similar episodes/previous evaluation): None    Precipitating or alleviating factors: None    Therapies tried and outcome: Vaseline      1 week ago bumped her L shin on the hitch of her vehicle.  The area bled.  She has cleaned it daily with soap and water and been using vaseline.  There is a small amount of redness immediately surrounding the wound that arose after using a band aid.  She reacted to the adhesive.  She notes some mild tenderness in the area with touch.  Otherwise well.  No fever, GI symptoms.  Feeling well.    Primary care through [facility], but her PCP was not in the office today.    She is managed by cardiology, does well with medications, very active.    Problem list and histories reviewed & adjusted, as indicated.  Additional history: as documented    Patient Active Problem List   Diagnosis     CARDIOVASCULAR SCREENING; LDL GOAL LESS THAN 130     Past Surgical History:   Procedure Laterality Date     APPENDECTOMY       ARTHROPLASTY HIP       C LUMBAR SPINE FUSION,ANTER APPRCH       COLONOSCOPY       HYSTERECTOMY RADICAL       PHACOEMULSIFICATION CLEAR CORNEA WITH STANDARD INTRAOCULAR LENS IMPLANT  2/13/2014    Procedure: PHACOEMULSIFICATION CLEAR CORNEA WITH STANDARD INTRAOCULAR LENS IMPLANT;   COMPLEX  LEFT PHACOEMULSIFICATION CLEAR CORNEA WITH STANDARD INTRAOCULAR LENS IMPLANT  ;  Surgeon: Walter Mauricio MD;  Location: [facility]     PHACOEMULSIFICATION CLEAR CORNEA WITH STANDARD INTRAOCULAR LENS IMPLANT  2/25/2014    Procedure: PHACOEMULSIFICATION CLEAR CORNEA WITH STANDARD INTRAOCULAR LENS IMPLANT;    COMPLEX  RIGHT PHACOEMULSIFICATION CLEAR CORNEA WITH STANDARD INTRAOCULAR LENS IMPLANT  "(TRYPAN, MALYUGIN RING);  Surgeon: Walter Mauricio MD;  Location: [facility]       Social History   Substance Use Topics     Smoking status: Never Smoker     Smokeless tobacco: Never Used     Alcohol use No      Comment: socially     Family History   Problem Relation Age of Onset     DIABETES Daughter            Reviewed and updated as needed this visit by clinical staff       Reviewed and updated as needed this visit by Provider         ROS:  SEE HPI.    OBJECTIVE:     /62 (BP Location: Right arm, Patient Position: Chair, Cuff Size: Adult Regular)  Pulse 94  Temp 97.5  F (36.4  C) (Oral)  Resp 16  Ht 5' 3.5\" (1.613 m)  Wt 146 lb 12.8 oz (66.6 kg)  SpO2 95%  BMI 25.6 kg/m2  Body mass index is 25.6 kg/(m^2).  GENERAL: healthy, alert and no distress  RESP: lungs clear to auscultation - no rales, rhonchi or wheezes  CV: regular rates and rhythm, normal S1 S2, no S3 or S4 and systolic murmur   SKIN: L shin with 2cm scabbed laceration, minimal surrounding erythema without induration.  PSYCH: mentation appears normal, affect normal/bright    Diagnostic Test Results:  none     ASSESSMENT/PLAN:   1. Laceration of left lower extremity, initial encounter  Appears to be healing without problem.  Redness likely from adhesive reaction.  Monitor.  Bacitracin.  Border of redness was marked for pt's reference.  Follow up if symptoms change or worsen.    RHM  Pt is established and seen regularly through [facility].    BHARGAVI Atkinson Ra, CNP  [facility] [place]  "

## 2018-11-12 ENCOUNTER — ANESTHESIA - HEALTHEAST (OUTPATIENT)
Dept: SURGERY | Facility: CLINIC | Age: 83
End: 2018-11-12

## 2018-11-13 ENCOUNTER — SURGERY - HEALTHEAST (OUTPATIENT)
Dept: SURGERY | Facility: CLINIC | Age: 83
End: 2018-11-13

## 2018-11-13 ASSESSMENT — MIFFLIN-ST. JEOR: SCORE: 1034.17

## 2021-06-02 VITALS — BODY MASS INDEX: 24.8 KG/M2 | WEIGHT: 140 LBS | HEIGHT: 63 IN

## 2021-06-21 NOTE — ANESTHESIA CARE TRANSFER NOTE
Last vitals:   Vitals:    11/13/18 0904   BP: 102/53   Pulse: (!) 104   Resp: 16   Temp: 36.2  C (97.1  F)   SpO2: 92%     Patient's level of consciousness is drowsy  Spontaneous respirations: yes  Maintains airway independently: yes  Dentition unchanged: yes  Oropharynx: oropharynx clear of all foreign objects    QCDR Measures:  ASA# 20 - Surgical Safety Checklist: WHO surgical safety checklist completed prior to induction    PQRS# 430 - Adult PONV Prevention: 4558F - Pt received => 2 anti-emetic agents (different classes) preop & intraop  ASA# 8 - Peds PONV Prevention: NA - Not pediatric patient, not GA or 2 or more risk factors NOT present  PQRS# 424 - Krystina-op Temp Management: 4559F - At least one body temp DOCUMENTED => 35.5C or 95.9F within required timeframe  PQRS# 426 - PACU Transfer Protocol: - Transfer of care checklist used  ASA# 14 - Acute Post-op Pain: ASA14B - Patient did NOT experience pain >= 7 out of 10

## 2021-06-21 NOTE — ANESTHESIA PREPROCEDURE EVALUATION
Anesthesia Evaluation      Patient summary reviewed   No history of anesthetic complications     Airway   Mallampati: I  Neck ROM: full   Pulmonary - negative ROS and normal exam    breath sounds clear to auscultation                         Cardiovascular - normal exam  (+) hypertension well controlled, valvular problems/murmurs (Mod MV regurgitation) MR, CAD, dysrhythmias (Hx transient AFib, P'VCx), , hypercholesterolemia,     ECG reviewed (NSR)  Rhythm: regular  Rate: normal,         Neuro/Psych    (+) depression, anxiety/panic attacks, chronic pain    Comments: Peripheral neuropathy  Glaucoma  [place]    Endo/Other    (+) hypothyroidism, arthritis,      GI/Hepatic/Renal    (+) GERD well controlled,             Dental - normal exam                        Anesthesia Plan  Planned anesthetic: spinal    ASA 3     Anesthetic plan and risks discussed with: patient and child/children    Post-op plan: routine recovery

## 2021-06-21 NOTE — ANESTHESIA PROCEDURE NOTES
ANESTHESIA SPINAL BLOCK    Patient location during procedure: OR  Start time: 11/13/2018 7:32 AM  End time: 11/13/2018 7:36 AM  Reason for block: primary anesthetic    Staffing:  Performing  Anesthesiologist: MAX Ramirez    Preanesthetic Checklist  Completed: patient identified, risks, benefits, and alternatives discussed, timeout performed, consent obtained, airway assessed, oxygen available, suction available, emergency drugs available and hand hygiene performed  Spinal Block  Patient position: sitting  Prep: Betadine  Patient monitoring: heart rate, continuous pulse ox and blood pressure  Approach: left paramedian  Location: L2-3  Injection technique: single-shot  Needle type: pencil-tip   Needle gauge: 22 G

## 2021-06-21 NOTE — ANESTHESIA POSTPROCEDURE EVALUATION
Patient: Carrol Diane  LEFT DIRECT ANTERIOR TOTAL HIP ARTHROPLASTY  Anesthesia type: spinal    Patient location: PACU  Last vitals:   Vitals:    11/13/18 1105   BP: 115/72   Pulse: 88   Resp: 17   Temp: 36.5  C (97.7  F)   SpO2: 95%     Post vital signs: stable  Level of consciousness: awake and responds to simple questions  Post-anesthesia pain: pain controlled  Post-anesthesia nausea and vomiting: no  Pulmonary: unassisted, return to baseline  Cardiovascular: stable and blood pressure at baseline  Hydration: adequate  Anesthetic events: no    QCDR Measures:  ASA# 11 - Krystina-op Cardiac Arrest: ASA11B - Patient did NOT experience unanticipated cardiac arrest  ASA# 12 - Krystina-op Mortality Rate: ASA12B - Patient did NOT die  ASA# 13 - PACU Re-Intubation Rate: NA - No ETT / LMA used for case  ASA# 10 - Composite Anes Safety: ASA10A - No serious adverse event    Additional Notes:

## 2021-08-24 ENCOUNTER — OFFICE VISIT (OUTPATIENT)
Dept: PODIATRY | Facility: CLINIC | Age: 86
End: 2021-08-24
Payer: MEDICARE

## 2021-08-24 VITALS — SYSTOLIC BLOOD PRESSURE: 144 MMHG | DIASTOLIC BLOOD PRESSURE: 72 MMHG | BODY MASS INDEX: 26.04 KG/M2 | WEIGHT: 147 LBS

## 2021-08-24 DIAGNOSIS — M79.671 FOOT PAIN, RIGHT: Primary | ICD-10-CM

## 2021-08-24 DIAGNOSIS — M20.41 HAMMERTOE, BILATERAL: ICD-10-CM

## 2021-08-24 DIAGNOSIS — M20.42 HAMMERTOE, BILATERAL: ICD-10-CM

## 2021-08-24 DIAGNOSIS — L84 CALLUS: ICD-10-CM

## 2021-08-24 PROCEDURE — 99203 OFFICE O/P NEW LOW 30 MIN: CPT | Performed by: PODIATRIST

## 2021-08-24 NOTE — PATIENT INSTRUCTIONS
Thank you for choosing [facility] Podiatry / Foot & Ankle Surgery!    DR. HUNTER'S CLINIC:  [facility] SCHEDULE SURGERY: [phone]   [address] BILLING QUESTIONS: [phone]   [place] [address] APPOINTMENTS: [phone]   PH: [phone] CONSUMER PRICE LINE:[phone]   FAX: [phone]      Follow up: as needed    CALLUS / CORNS / IPKs  When there is excessive friction or pressure on the skin, the body responds by making the skin thicker to protect the deeper structures from becoming exposed. While this works well to protect the deeper structures, the thickened skin can increase pressure and pain.    CALLUS: Flat, diffuse thickening are simple calluses and they are usually caused by friction. Often these are the result of rubbing on a shoe or going barefoot.    CORNS: Calluses with a central core between the toes are called corns. These result from prominent joints on adjacent toes rubbing together. Theses are a symptom of bone malalignment and will always recur unless the underlying bones are addressed surgically.    IPKs: Calluses with a central core on the ball of the foot are usually IPKs (intractable plantar keratosis). These are caused by excessive pressure from the metatarsals, the bones that make up the ball of the foot. Often one of these bones is too long or too prominent.  Again, these will always recur unless the underlying bone issue is addressed. There is no cure for these. They will either go away by themselves, recur, or more could develop.    ROUTINE MAINTENANCE  1. File them down with a pumice stone or callus file a couple times a week.   2. An electric callus removing device. Amope Pedi Perfect Electronic Pedicure Foot File and Callus Remover can be a good option.   3. Lotion can be applied to soften the callus. A urea based cream such as Kersal or Vanicream or thicker cream with shea butter are good options.  4. Toe spacers or toe covers can be  used for corns, gel pads can be used for other lesions on the bottom of the foot.   If there is a surgical pathology noted, such as a prominent bone, often this needs to be addressed surgically to minimize recurrence. However, sometimes the lesion simply migrates to another spot after surgery, so it is not a guaranteed cure.     There was also discussion of the cost structure of callus care if they were to come back and have it treated in the clinic. Explained that if insurance does not cover it, they would be billed. This charge could range from $100 - $160.  They were also provided information on places to get the callus treatment.      HAMMERTOES  Hammertoe is a contracture (bending) of one or both joints of the second, third, fourth, or fifth (little) toes. This abnormal bending can put pressure on the toe when wearing shoes, causing problems to develop.  Hammertoes usually start out as mild deformities and get progressively worse over time. In the earlier stages, hammertoes are flexible and the symptoms can often be managed with noninvasive measures. But if left untreated, hammertoes can become more rigid and will not respond to non-surgical treatment.  Because of the progressive nature of hammertoes, they should receive early attention. Hammertoes never get better without some kind of intervention.  CAUSES  The most common cause of hammertoe is a muscle/tendon imbalance. This imbalance, which leads to a bending of the toe, results from mechanical (structural) changes in the foot that occur over time in some people.  Hammertoes may be aggravated by shoes that don t fit properly. A hammertoe may result if a toe is too long and is forced into a cramped position when a tight shoe is worn.  Occasionally, hammertoe is the result of an earlier trauma to the toe. In some people, hammertoes are inherited.  SYMPTOMS  Pain or irritation of the affected toe when wearing shoes.   Corns and calluses (a buildup of skin) on  the toe, between two toes, or on the ball of the foot. Corns are caused by constant friction against the shoe. They may be soft or hard, depending upon their location.   Inflammation, redness, or a burning sensation   Contracture of the toe   In more severe cases of hammertoe, open sores may form.   DIAGNOSIS  Although hammertoes are readily apparent, to arrive at a diagnosis the foot and ankle surgeon will obtain a thorough history of your symptoms and examine your foot. During the physical examination, the doctor may attempt to reproduce your symptoms by manipulating your foot and will study the contractures of the toes. In addition, the foot and ankle surgeon may take x-rays to determine the degree of the deformities and assess any changes that may have occurred.   Hammertoes are progressive - they don t go away by themselves and usually they will get worse over time. However, not all cases are alike - some hammertoes progress more rapidly than others. Once your foot and ankle surgeon has evaluated your hammertoes, a treatment plan can be developed that is suited to your needs.  NON-SURGICAL TREATMENT  There is a variety of treatment options for hammertoe. The treatment your foot and ankle surgeon selects will depend upon the severity of your hammertoe and other factors.  A number of non-surgical measures can be undertaken:  Padding corns and calluses. Your foot and ankle surgeon can provide or prescribe pads designed to shield corns from irritation. If you want to try over-the-counter pads, avoid the medicated types. Medicated pads are generally not recommended because they may contain a small amount of acid that can be harmful. Consult your surgeon about this option.   Changes in shoewear. Avoid shoes with pointed toes, shoes that are too short, or shoes with high heels - conditions that can force your toe against the front of the shoe. Instead, choose comfortable shoes with a deep, roomy toe box and heels no  higher than two inches.   Orthotic devices. A custom orthotic device placed in your shoe may help control the muscle/tendon imbalance.   Injection therapy. Corticosteroid injections are sometimes used to ease pain and inflammation caused by hammertoe.   Medications. Oral nonsteroidal anti-inflammatory drugs (NSAIDs), such as ibuprofen, may be recommended to reduce pain and inflammation.   Splinting/strapping. Splints or small straps may be applied by the surgeon to realign the bent toe.   Exercises:   1. The Toe Tap  Stand flat on the ground in your bare feet. Raise all of your toes up off the ground as high as you can. Then starting with the little toes, slowly press them down to the ground. After the big toes are on the ground, start over by raising all of them up off the ground again. This motion is similar to tapping your fingers on a desk. Repeat this ten times.     2. Interlocking your Fingers and Toes  Cross your right foot over your knee and place the fingers of your left hand between your toes. Squeeze your toes together, pinching your fingers between them. Spread the toes apart and squeeze them together again. Repeat this ten times then do the other foot. Like most exercises, this will get easier the more you do it. If you are having a lot of difficulty with this exercise, start with just your index finger between your first and second toe, then later add your middle finger between your second and third toes, and so on until you can fit all your fingers between your toes. Do this ten times on each foot. Eventually you will be able to spread your toes apart without using your fingers.    3. Gripping the Floor   the floor by pressing the pads of your toes (not the tips) into the floor without curling your toes. Relax and repeat this ten times. If your shoes have the proper amount of depth, you should be able to do this with shoes on.    HAMMERTOE TOE SURGERY   Hammertoe surgery is complex. The surgical  procedure is an attempt to help the toe lay in a better position. Nearly every structure in the toe will be cut including the tendons, ligaments, skin and bone. Hammertoes are a complex deformity and final toe position is difficult to predict. The only sure way to position a toe is to fuse all three digital joints. That will not happen as some degree of toe motion is needed for walking. The toe may not be completely reduced as the surrounding skin and other structures may not allow the toe to return to a normal position. The tendons on adjacent toes may need to be cut at the time of the original or subsequent surgeries, as interconnections exist between the toes. The toe may drift after surgery. Stiffness may develop leading to new areas of pressure.   Future shoe choices will be critical in allowing the surgery to provide comfort. The toes will still hurt if shoes rub. The original pain may also persist as other foot problems may be contributing to the current pain. The toe may or may not be pinned in place. External pins would require complete avoidance of water on the foot for six weeks. The pin would be removed about six weeks after the surgery. Strict attention to protection is critical. The pin could get bumped or loosen resulting in early removal. Removal might be necessary before the bone heals which would negatively affect the final surgical outcome and toe allignment.

## 2021-08-24 NOTE — LETTER
8/24/2021         RE: Carrol Diane  [address]  [facility] [address]        Dear Colleague,    Thank you for referring your patient, Carrol Diane, to the [facility] PODIATRY. Please see a copy of my visit note below.    PATIENT HISTORY:  Carrol Diane is a 89 year old female who presents to clinic for pain to the right fourth and fifth toes.  Here with her daughter.  Notes that she has painful calluses.  Has had them for over a year.  Worse in certain shoes.  Better if she is barefoot.  Can be 4-10 pain at its worst.  She is wondering what can be done to get rid of them.    Review of Systems:  Patient denies fever, chills, rash, wound, stiffness, limping, numbness, weakness, heart burn, blood in stool, chest pain with activity, calf pain when walking, shortness of breath with activity, chronic cough, easy bleeding/bruising, swelling of ankles, excessive thirst, fatigue, depression, anxiety.       PAST MEDICAL HISTORY:   Past Medical History:   Diagnosis Date     Allergic rhinitis      Anxiety disorder      Arrhythmia     atrial fibrillation     Arthritis      Chest pain      Coronary artery disease      Depression      Disc disorder of lumbar region      Diverticulosis      Dyspepsia      Gastro-oesophageal reflux disease      Glaucoma      Heart murmur     mitral valve disorder     Hemorrhoids      History of angina      Rampart (hard of hearing)      Hypercholesterolemia      Hypertension      Osteopenia      Pelvic floor dysfunction      Peripheral neuropathy      Radiculopathy of lumbar region      Sinus node dysfunction (H)      Thyroid disease      Type 2 diabetes mellitus without complications (H)         PAST SURGICAL HISTORY:   Past Surgical History:   Procedure Laterality Date     APPENDECTOMY       APPENDECTOMY       ARTHROPLASTY HIP       BACK SURGERY       C LUMBAR SPINE FUSION,ANTER APPRCH       C TOTAL HIP ARTHROPLASTY Left 11/13/2018    Procedure: LEFT DIRECT  ANTERIOR TOTAL HIP ARTHROPLASTY;  Surgeon: Ivan Carrasquillo MD;  Location: [facility];  Service: Orthopedics     COLONOSCOPY       HYSTERECTOMY       HYSTERECTOMY RADICAL       JOINT REPLACEMENT       PHACOEMULSIFICATION CLEAR CORNEA WITH STANDARD INTRAOCULAR LENS IMPLANT  2/13/2014    Procedure: PHACOEMULSIFICATION CLEAR CORNEA WITH STANDARD INTRAOCULAR LENS IMPLANT;   COMPLEX  LEFT PHACOEMULSIFICATION CLEAR CORNEA WITH STANDARD INTRAOCULAR LENS IMPLANT  ;  Surgeon: Walter Mauricio MD;  Location: [facility]     PHACOEMULSIFICATION CLEAR CORNEA WITH STANDARD INTRAOCULAR LENS IMPLANT  2/25/2014    Procedure: PHACOEMULSIFICATION CLEAR CORNEA WITH STANDARD INTRAOCULAR LENS IMPLANT;    COMPLEX  RIGHT PHACOEMULSIFICATION CLEAR CORNEA WITH STANDARD INTRAOCULAR LENS IMPLANT (TRYPAN, MALYUGIN RING);  Surgeon: Walter Mauricio MD;  Location: [facility]        MEDICATIONS:   Current Outpatient Medications:      aspirin 81 MG tablet, Take 1 tablet by mouth daily., Disp: , Rfl:      Calcium-Vitamin D (CALCIUM + D PO), Take  by mouth., Disp: , Rfl:      CENTRUM SILVER OR, None Entered, Disp: , Rfl:      Cholecalciferol (VITAMIN D3) 1000 UNIT TABS, Take  by mouth., Disp: , Rfl:      dorzolamide-timolol (COSOPT) 2-0.5 % ophthalmic solution, Place 1 drop into both eyes 2 times daily, Disp: , Rfl:      Escitalopram Oxalate (LEXAPRO PO), Take 10 mg by mouth daily, Disp: , Rfl:      fluticasone (FLONASE) 50 MCG/ACT nasal spray, Spray 2 sprays into both nostrils daily, Disp: , Rfl:      GABAPENTIN 300 MG OR CAPS, 1 CAPSULE 3 TIMES DAILY, Disp: , Rfl:      hydrocortisone 2.5 % cream, Apply 0.5 inches topically 2 times daily., Disp: , Rfl:      Isosorbide Mononitrate (IMDUR PO), Take 60 mg by mouth daily, Disp: , Rfl:      LEVOTHROID 75 MCG OR TABS, 1 TABLET DAILY, Disp: , Rfl:      LISINOPRIL 40 MG OR TABS, 1 TABLET DAILY, Disp: , Rfl:      loratadine (CLARITIN) 10 MG tablet, Take 10 mg by mouth daily, Disp: , Rfl:       LOVASTATIN 40 MG OR TABS, 1 TABLET DAILY AT bedtime, Disp: , Rfl:      NIFEdipine (PROCARDIA XL PO), Take 90 mg by mouth daily, Disp: , Rfl:      Nitroglycerin (NITROSTAT SL), Place 0.4 mg under the tongue every 5 minutes as needed, Disp: , Rfl:      Pantoprazole Sodium (PROTONIX PO), Take 40 mg by mouth daily, Disp: , Rfl:      saccharomyces boulardii (FLORASTOR) 250 MG capsule, Take 250 mg by mouth once, Disp: , Rfl:      senna (SENOKOT) 8.6 MG tablet, Take 1 tablet by mouth daily., Disp: , Rfl:      tramadol (ULTRAM) 50 MG tablet, Take 50 mg by mouth every 6 hours as needed., Disp: , Rfl:      TYLENOL ARTHRITIS PAIN OR, prn, Disp: , Rfl:      XALATAN 0.005 % OP SOLN, 1 drop at bdtime daily, Disp: , Rfl:      ALLERGIES:    Allergies   Allergen Reactions     Coumadin [Warfarin]      Dyazide [Hydrochlorothiazide W/Triamterene]      Monistat 1 [Tioconazole]      Burning and itching     Seasonale      Verapamil         SOCIAL HISTORY:   Social History     Socioeconomic History     Marital status:      Spouse name: Not on file     Number of children: Not on file     Years of education: Not on file     Highest education level: Not on file   Occupational History     Not on file   Tobacco Use     Smoking status: Never Smoker     Smokeless tobacco: Never Used   Substance and Sexual Activity     Alcohol use: Yes     Comment: Alcoholic Drinks/day: 1 a month     Drug use: No     Sexual activity: Not on file   Other Topics Concern     Parent/sibling w/ CABG, MI or angioplasty before 65F 55M? Not Asked   Social History Narrative     Not on file     Social Determinants of Health     Financial Resource Strain:      Difficulty of Paying Living Expenses:    Food Insecurity:      Worried About Running Out of Food in the Last Year:      Ran Out of Food in the Last Year:    Transportation Needs:      Lack of Transportation (Medical):      Lack of Transportation (Non-Medical):    Physical Activity:      Days of Exercise per  Week:      Minutes of Exercise per Session:    Stress:      Feeling of Stress :    Social Connections:      Frequency of Communication with Friends and Family:      Frequency of Social Gatherings with Friends and Family:      Attends Hinduism Services:      Active Member of Clubs or Organizations:      Attends Club or Organization Meetings:      Marital Status:    Intimate Partner Violence:      Fear of Current or Ex-Partner:      Emotionally Abused:      Physically Abused:      Sexually Abused:         FAMILY HISTORY:   Family History   Problem Relation Age of Onset     Diabetes Daughter         EXAM:Vitals: BP (!) 144/72   Wt 66.7 kg (147 lb)   BMI 26.04 kg/m    BMI= Body mass index is 26.04 kg/m .      General appearance: Patient is alert and fully cooperative with history & exam.  No sign of distress is noted during the visit.     Psychiatric: Affect is pleasant & appropriate.  Patient appears motivated to improve health.     Respiratory: Breathing is regular & unlabored while sitting.     HEENT: Hearing is intact to spoken word.  Speech is clear.  No gross evidence of visual impairment that would impact ambulation.     Dermatologic: Localized hyperkeratotic lesion to the medial aspect of the right fifth toe and lateral aspect of the right fourth PIPJ.  No open lesions or signs of infection noted.     Vascular: DP & PT pulses are intact & regular bilaterally.  No significant edema but varicosities noted.  CFT and skin temperature is normal to both lower extremities.     Neurologic: Lower extremity sensation is intact to light touch.  No evidence of weakness or contracture in the lower extremities.  No evidence of neuropathy.     Musculoskeletal: Patient is ambulatory without assistive device or brace.  Overlapping fifth toes on the fourth toes bilaterally.       ASSESSMENT:    Foot pain, right  Hammertoe, bilateral  Callus     Medical Decision Making/Plan:  Reviewed patient's chart in [facility]. Reviewed and  discussed causes of hammertoes with patient.  Explained that this can be caused by an overpowering of muscles or by the way we walk.  Discussed conservative treatments such as orthotics, pads, shoe gear.  Explained that sometimes the flexor tendons can be cut to try and straighten the toe and reduce rubbing. This is normally done in office and patient is weight bearing in postop she for 1-2 weeks.  We also discussed surgical intervention to remove the joint and possibly fuse the toe.  Normally patient has a pin sticking out of the toe for about 6 weeks and can not get the foot wet. Patient would have to be minimal weight bearing in cam boot.      Discussed causes of keratomas.  They are due to areas of increase friction.  Hammertoes can create these as they put more pressure to the metatarsal head.  Discussed treatments such as using foot file, pumice stone, metatarsal pads, orthotics, and not walking barefoot.     We discussed the cost structure of callus care if they were to come back and have it treated in the clinic if insurance does not cover it and explained that they would be billed. They were also provided information on places to get the callus treatment.    At this time recommend using a nonmedicated corn pad or cotton ball in between the toes.  Recommend wearing shoes that are wider in the toe box.  Also recommend using a pumice stone or foot file.  All questions were answered to patient and patient's daughter satisfaction and they will call for the questions or concerns.    Patient risk factor: Patient is at low risk for infection.        Divine Duggan DPM, Podiatry/Foot and Ankle Surgery        Again, thank you for allowing me to participate in the care of your patient.        Sincerely,        Divine Duggan DPM, Podiatry/Foot and Ankle Surgery

## 2021-08-24 NOTE — PROGRESS NOTES
PATIENT HISTORY:  Carrol Diane is a 89 year old female who presents to clinic for pain to the right fourth and fifth toes.  Here with her daughter.  Notes that she has painful calluses.  Has had them for over a year.  Worse in certain shoes.  Better if she is barefoot.  Can be 4-10 pain at its worst.  She is wondering what can be done to get rid of them.    Review of Systems:  Patient denies fever, chills, rash, wound, stiffness, limping, numbness, weakness, heart burn, blood in stool, chest pain with activity, calf pain when walking, shortness of breath with activity, chronic cough, easy bleeding/bruising, swelling of ankles, excessive thirst, fatigue, depression, anxiety.       PAST MEDICAL HISTORY:   Past Medical History:   Diagnosis Date     Allergic rhinitis      Anxiety disorder      Arrhythmia     atrial fibrillation     Arthritis      Chest pain      Coronary artery disease      Depression      Disc disorder of lumbar region      Diverticulosis      Dyspepsia      Gastro-oesophageal reflux disease      Glaucoma      Heart murmur     mitral valve disorder     Hemorrhoids      History of angina      [place] (hard of hearing)      Hypercholesterolemia      Hypertension      Osteopenia      Pelvic floor dysfunction      Peripheral neuropathy      Radiculopathy of lumbar region      Sinus node dysfunction (H)      Thyroid disease      Type 2 diabetes mellitus without complications (H)         PAST SURGICAL HISTORY:   Past Surgical History:   Procedure Laterality Date     APPENDECTOMY       APPENDECTOMY       ARTHROPLASTY HIP       BACK SURGERY       C LUMBAR SPINE FUSION,ANTER APPRCH       C TOTAL HIP ARTHROPLASTY Left 11/13/2018    Procedure: LEFT DIRECT ANTERIOR TOTAL HIP ARTHROPLASTY;  Surgeon: Ivan Carrasquillo MD;  Location: [facility];  Service: Orthopedics     COLONOSCOPY       HYSTERECTOMY       HYSTERECTOMY RADICAL       JOINT REPLACEMENT       PHACOEMULSIFICATION CLEAR CORNEA WITH STANDARD INTRAOCULAR  LENS IMPLANT  2/13/2014    Procedure: PHACOEMULSIFICATION CLEAR CORNEA WITH STANDARD INTRAOCULAR LENS IMPLANT;   COMPLEX  LEFT PHACOEMULSIFICATION CLEAR CORNEA WITH STANDARD INTRAOCULAR LENS IMPLANT  ;  Surgeon: Walter Mauricio MD;  Location: [facility]     PHACOEMULSIFICATION CLEAR CORNEA WITH STANDARD INTRAOCULAR LENS IMPLANT  2/25/2014    Procedure: PHACOEMULSIFICATION CLEAR CORNEA WITH STANDARD INTRAOCULAR LENS IMPLANT;    COMPLEX  RIGHT PHACOEMULSIFICATION CLEAR CORNEA WITH STANDARD INTRAOCULAR LENS IMPLANT (TRYPAN, MALYUGIN RING);  Surgeon: Walter Mauricio MD;  Location: [facility]        MEDICATIONS:   Current Outpatient Medications:      aspirin 81 MG tablet, Take 1 tablet by mouth daily., Disp: , Rfl:      Calcium-Vitamin D (CALCIUM + D PO), Take  by mouth., Disp: , Rfl:      CENTRUM SILVER OR, None Entered, Disp: , Rfl:      Cholecalciferol (VITAMIN D3) 1000 UNIT TABS, Take  by mouth., Disp: , Rfl:      dorzolamide-timolol (COSOPT) 2-0.5 % ophthalmic solution, Place 1 drop into both eyes 2 times daily, Disp: , Rfl:      Escitalopram Oxalate (LEXAPRO PO), Take 10 mg by mouth daily, Disp: , Rfl:      fluticasone (FLONASE) 50 MCG/ACT nasal spray, Spray 2 sprays into both nostrils daily, Disp: , Rfl:      GABAPENTIN 300 MG OR CAPS, 1 CAPSULE 3 TIMES DAILY, Disp: , Rfl:      hydrocortisone 2.5 % cream, Apply 0.5 inches topically 2 times daily., Disp: , Rfl:      Isosorbide Mononitrate (IMDUR PO), Take 60 mg by mouth daily, Disp: , Rfl:      LEVOTHROID 75 MCG OR TABS, 1 TABLET DAILY, Disp: , Rfl:      LISINOPRIL 40 MG OR TABS, 1 TABLET DAILY, Disp: , Rfl:      loratadine (CLARITIN) 10 MG tablet, Take 10 mg by mouth daily, Disp: , Rfl:      LOVASTATIN 40 MG OR TABS, 1 TABLET DAILY AT bedtime, Disp: , Rfl:      NIFEdipine (PROCARDIA XL PO), Take 90 mg by mouth daily, Disp: , Rfl:      Nitroglycerin (NITROSTAT SL), Place 0.4 mg under the tongue every 5 minutes as needed, Disp: , Rfl:      Pantoprazole Sodium  (PROTONIX PO), Take 40 mg by mouth daily, Disp: , Rfl:      saccharomyces boulardii (FLORASTOR) 250 MG capsule, Take 250 mg by mouth once, Disp: , Rfl:      senna (SENOKOT) 8.6 MG tablet, Take 1 tablet by mouth daily., Disp: , Rfl:      tramadol (ULTRAM) 50 MG tablet, Take 50 mg by mouth every 6 hours as needed., Disp: , Rfl:      TYLENOL ARTHRITIS PAIN OR, prn, Disp: , Rfl:      XALATAN 0.005 % OP SOLN, 1 drop at bdtime daily, Disp: , Rfl:      ALLERGIES:    Allergies   Allergen Reactions     Coumadin [Warfarin]      Dyazide [Hydrochlorothiazide W/Triamterene]      Monistat 1 [Tioconazole]      Burning and itching     Seasonale      Verapamil         SOCIAL HISTORY:   Social History     Socioeconomic History     Marital status:      Spouse name: Not on file     Number of children: Not on file     Years of education: Not on file     Highest education level: Not on file   Occupational History     Not on file   Tobacco Use     Smoking status: Never Smoker     Smokeless tobacco: Never Used   Substance and Sexual Activity     Alcohol use: Yes     Comment: Alcoholic Drinks/day: 1 a month     Drug use: No     Sexual activity: Not on file   Other Topics Concern     Parent/sibling w/ CABG, MI or angioplasty before 65F 55M? Not Asked   Social History Narrative     Not on file     Social Determinants of Health     Financial Resource Strain:      Difficulty of Paying Living Expenses:    Food Insecurity:      Worried About Running Out of Food in the Last Year:      Ran Out of Food in the Last Year:    Transportation Needs:      Lack of Transportation (Medical):      Lack of Transportation (Non-Medical):    Physical Activity:      Days of Exercise per Week:      Minutes of Exercise per Session:    Stress:      Feeling of Stress :    Social Connections:      Frequency of Communication with Friends and Family:      Frequency of Social Gatherings with Friends and Family:      Attends Jewish Services:      Active Member of  Clubs or Organizations:      Attends Club or Organization Meetings:      Marital Status:    Intimate Partner Violence:      Fear of Current or Ex-Partner:      Emotionally Abused:      Physically Abused:      Sexually Abused:         FAMILY HISTORY:   Family History   Problem Relation Age of Onset     Diabetes Daughter         EXAM:Vitals: BP (!) 144/72   Wt 66.7 kg (147 lb)   BMI 26.04 kg/m    BMI= Body mass index is 26.04 kg/m .      General appearance: Patient is alert and fully cooperative with history & exam.  No sign of distress is noted during the visit.     Psychiatric: Affect is pleasant & appropriate.  Patient appears motivated to improve health.     Respiratory: Breathing is regular & unlabored while sitting.     HEENT: Hearing is intact to spoken word.  Speech is clear.  No gross evidence of visual impairment that would impact ambulation.     Dermatologic: Localized hyperkeratotic lesion to the medial aspect of the right fifth toe and lateral aspect of the right fourth PIPJ.  No open lesions or signs of infection noted.     Vascular: DP & PT pulses are intact & regular bilaterally.  No significant edema but varicosities noted.  CFT and skin temperature is normal to both lower extremities.     Neurologic: Lower extremity sensation is intact to light touch.  No evidence of weakness or contracture in the lower extremities.  No evidence of neuropathy.     Musculoskeletal: Patient is ambulatory without assistive device or brace.  Overlapping fifth toes on the fourth toes bilaterally.       ASSESSMENT:    Foot pain, right  Hammertoe, bilateral  Callus     Medical Decision Making/Plan:  Reviewed patient's chart in [facility]. Reviewed and discussed causes of hammertoes with patient.  Explained that this can be caused by an overpowering of muscles or by the way we walk.  Discussed conservative treatments such as orthotics, pads, shoe gear.  Explained that sometimes the flexor tendons can be cut to try and straighten  the toe and reduce rubbing. This is normally done in office and patient is weight bearing in postop she for 1-2 weeks.  We also discussed surgical intervention to remove the joint and possibly fuse the toe.  Normally patient has a pin sticking out of the toe for about 6 weeks and can not get the foot wet. Patient would have to be minimal weight bearing in cam boot.      Discussed causes of keratomas.  They are due to areas of increase friction.  Hammertoes can create these as they put more pressure to the metatarsal head.  Discussed treatments such as using foot file, pumice stone, metatarsal pads, orthotics, and not walking barefoot.     We discussed the cost structure of callus care if they were to come back and have it treated in the clinic if insurance does not cover it and explained that they would be billed. They were also provided information on places to get the callus treatment.    At this time recommend using a nonmedicated corn pad or cotton ball in between the toes.  Recommend wearing shoes that are wider in the toe box.  Also recommend using a pumice stone or foot file.  All questions were answered to patient and patient's daughter satisfaction and they will call for the questions or concerns.    Patient risk factor: Patient is at low risk for infection.        Divine Duggan DPM, Podiatry/Foot and Ankle Surgery

## 2022-03-10 ENCOUNTER — HOSPITAL ENCOUNTER (EMERGENCY)
Facility: CLINIC | Age: 87
Discharge: HOME | End: 2022-03-10
Attending: EMERGENCY MEDICINE | Admitting: EMERGENCY MEDICINE
Payer: MEDICARE

## 2022-03-10 VITALS
SYSTOLIC BLOOD PRESSURE: 158 MMHG | DIASTOLIC BLOOD PRESSURE: 86 MMHG | OXYGEN SATURATION: 98 % | TEMPERATURE: 97.3 F | RESPIRATION RATE: 18 BRPM | HEART RATE: 77 BPM

## 2022-03-10 DIAGNOSIS — R04.0 EPISTAXIS: ICD-10-CM

## 2022-03-10 PROCEDURE — 99283 EMERGENCY DEPT VISIT LOW MDM: CPT

## 2022-03-10 PROCEDURE — 30901 CONTROL OF NOSEBLEED: CPT

## 2022-03-10 RX ORDER — LIDOCAINE HYDROCHLORIDE AND EPINEPHRINE 10; 10 MG/ML; UG/ML
INJECTION, SOLUTION INFILTRATION; PERINEURAL
Status: DISCONTINUED
Start: 2022-03-10 | End: 2022-03-11 | Stop reason: HOSPADM

## 2022-03-10 ASSESSMENT — ENCOUNTER SYMPTOMS: COLOR CHANGE: 0

## 2022-03-11 NOTE — ED TRIAGE NOTES
Right sided nose bleed with clots that 1.5hr. Denies anticoagulants. This happened approx 10 years ago. Denies light headedness or dizziness. Clamp applied in triage.

## 2022-03-11 NOTE — ED PROVIDER NOTES
History   Chief Complaint:  Epistaxis       HPI   Carrol Diane is a 89 year old female with history of atrial fibrillation, type 2 diabetes, hypertension, among others, who presents with epistaxis from her right nares while doing a puzzle. She notes that she blew her nose 15 minutes prior to onset. She denies any trauma. No abnormal bleeding/bruising elsewhere.     Review of Systems   HENT: Positive for nosebleeds.    Skin: Negative for color change.   All other systems reviewed and are negative.    Allergies:  Coumadin [Warfarin]  Dyazide [Hydrochlorothiazide W/Triamterene]  Monistat 1 [Tioconazole]  Seasonale  Verapamil    Medications:  Neurontin  Allegra  Protonix  Nitrostat  Lexapro  Ditropan  Prinivil  Synthroid  Adalat    Past Medical History:     Anxiety  Arthritis  CAD  Depression  Diverticulosis  GERD  Glaucoma  Angina  Hyperlipidemia  Hypertension  Osteopenia  Peripheral neuropathy  Sinus node dysfunction  Type 2 diabetes  Atrial fibrillation  Left carotid artery stenosis    Past Surgical History:    Appendectomy  Colonoscopy  Hysterectomy  Phacoemulsification clear cornea with lens implant x2  Lumbar spine fusion  Total hip arthroplasty x2    Family History:    Mother: Heart diease, hypertension  Father: Heart disease  Brother: Cancer  Sister: Glaucoma, diabetes  Daughter: Diabetes, neuropathy    Social History:  The patient presents to the ED with her daughter.   Likes to do puzzles.     Physical Exam     Patient Vitals for the past 24 hrs:   BP Temp Temp src Pulse Resp SpO2   03/10/22 2115 (!) 158/86 -- -- 77 -- --   03/10/22 2100 (!) 175/89 -- -- 73 18 --   03/10/22 2054 (!) 173/92 -- -- 76 -- --   03/10/22 2042 (!) 196/109 97.3  F (36.3  C) Temporal 87 20 98 %       Physical Exam  Constitutional: Patient is well appearing. No distress.  Head: Atraumatic.  Nose right nares has slight ooze anterior no post moises blood.  Not copious.  Not arterial.  Clip in place nearly stopped.  Mouth/Throat: Oropharynx  is clear and moist. No oropharyngeal exudate.  Eyes: Conjunctivae and EOM are normal. No scleral icterus.  Neck: Normal range of motion. Neck supple.   Cardiovascular: Normal rate, regular rhythm, normal heart sounds and intact distal pulses.   Pulmonary/Chest: Breath sounds normal. No respiratory distress.  Abdominal: Soft. Bowel sounds are normal. No distension. No tenderness. No rebound or guarding.   Musculoskeletal: Normal range of motion. No edema or tenderness.   Neurological: Alert and orientated to person, place, and time. No observable focal neuro deficit  Skin: Warm and dry. No rash noted. Not diaphoretic.     Emergency Department Course     Procedures     Nasal Packing     PROCEDURE NOTE: The patient's right nare was packed with 1% lidocaine with epinephrine soaked gauze. She was observed in the emergency department for 30 minutes following the procedure. Packing was removed and had no recurrence of her bleeding.     Emergency Department Course:         Reviewed:  I reviewed nursing notes, vitals, past medical history and Care Everywhere    Assessments:  2042 I obtained history and examined the patient as noted above.     2147 I rechecked the patient and explained findings.     Disposition:  The patient was discharged to home.     Impression & Plan     Medical Decision Making:  Carrol Diane is a 89 year old female who presents for evaluation of epistaxis. The bleeding was controlled with interventions in the ED, see procedure note above. No further bleeding noted after packing removed therefore supportive outpatient management is indicated. Close follow-up  primary care; and return ER instructions. There are no signs of coagulopathy causing the bleeding or a general medical condition (thrombocytopenia, DIC, leukemia, etc) causing the bleeding today.    Diagnosis:    ICD-10-CM    1. Epistaxis  R04.0      Scribe Disclosure:  Manjeet MONROE, am serving as a scribe at 8:46 PM on 3/10/2022 to document  services personally performed by Raz Gonzalez MD based on my observations and the provider's statements to me.          Raz Gonzalez MD  03/10/22 9114

## 2023-02-22 ENCOUNTER — TRANSCRIBE ORDERS (OUTPATIENT)
Dept: OTHER | Age: 88
End: 2023-02-22

## 2023-02-22 DIAGNOSIS — M25.511 CHRONIC RIGHT SHOULDER PAIN: ICD-10-CM

## 2023-02-22 DIAGNOSIS — G89.29 CHRONIC RIGHT SHOULDER PAIN: ICD-10-CM

## 2023-02-22 DIAGNOSIS — M19.011 PRIMARY OSTEOARTHRITIS OF RIGHT SHOULDER: ICD-10-CM

## 2023-02-22 DIAGNOSIS — M75.01 ADHESIVE CAPSULITIS OF RIGHT SHOULDER: Primary | ICD-10-CM

## 2023-03-09 ENCOUNTER — THERAPY VISIT (OUTPATIENT)
Dept: PHYSICAL THERAPY | Facility: CLINIC | Age: 88
End: 2023-03-09
Attending: FAMILY MEDICINE
Payer: MEDICARE

## 2023-03-09 DIAGNOSIS — M19.011 PRIMARY OSTEOARTHRITIS OF RIGHT SHOULDER: ICD-10-CM

## 2023-03-09 DIAGNOSIS — G89.29 CHRONIC RIGHT SHOULDER PAIN: ICD-10-CM

## 2023-03-09 DIAGNOSIS — M25.511 CHRONIC RIGHT SHOULDER PAIN: ICD-10-CM

## 2023-03-09 DIAGNOSIS — M25.511 SHOULDER PAIN, RIGHT: ICD-10-CM

## 2023-03-09 DIAGNOSIS — M75.01 ADHESIVE CAPSULITIS OF RIGHT SHOULDER: ICD-10-CM

## 2023-03-09 PROCEDURE — 97110 THERAPEUTIC EXERCISES: CPT | Mod: GP | Performed by: PHYSICAL THERAPIST

## 2023-03-09 PROCEDURE — 97162 PT EVAL MOD COMPLEX 30 MIN: CPT | Mod: GP | Performed by: PHYSICAL THERAPIST

## 2023-03-09 NOTE — PROGRESS NOTES
Physical Therapy Initial Evaluation  Subjective:  The history is provided by the patient (and her daughter ). No  was used.   Patient Health History  Carrol Diane being seen for right shoulder pain .     Problem began: 2/22/2023 ( MD order).   Problem occurred: insidious onset    Pain is reported as 8/10 on pain scale.  General health as reported by patient is good.  Pertinent medical history includes: depression, heart problems and high blood pressure.   Red flags:  None as reported by patient.  Medical allergies: none.   Surgeries include:  Orthopedic surgery. Other surgery history details: B DARÍO 2014 and 2018.    Current medications:  Anti-depressants, bone density, cardiac medication, high blood pressure medication, pain medication and thyroid medication.    Current occupation is Retired.                     Therapist Generated HPI Evaluation  Problem details: Reports that she has had pain for at least 4 years. Has been having injections over the last few years and they have been working and last about 4-5 months. States the last injection she had did not help. States she tries not to use the right arm even after she gets the injections. Pain with any movement of the arm away from the body. Unable to lift her coffee cup with her right hand. .         Type of problem:  Right shoulder.    This is a chronic condition.  Condition occurred with:  Degenerative joint disease.    Patient reports pain:  Anterior, in the joint, upper arm and posterior.  Pain is described as aching and sharp and is intermittent.    Since onset symptoms are unchanged.  Associated symptoms:  Loss of motion/stiffness and loss of strength. Symptoms are exacerbated by lifting, carrying and using arm at shoulder level  and relieved by rest, ice and analgesics.    Previous treatment includes other. There was moderate (injections ) improvement following previous treatment.  Barriers include:  Lives alone and  transportation.                        Objective:  System                   Shoulder Evaluation:  ROM:  AROM:    Flexion:  Right:  85    Abduction:  Right:  110      External Rotation:  Right:  45                PROM:    Flexion:  Right: 145      Abduction:  Right:  120      External Rotation:  Right:  45                Pain: pain with active flexion the most and also some with all active motions. No pain with passive ROM until end range     Strength:    Flexion: Right: 3+/5      Pain:  +    Abduction:  Right: 4/5     Pain:    Internal Rotation:  Right: 4+/5     Pain:  External Rotation:   Right:3+/5     Pain:              Special Tests:      Right shoulder positive for the following special tests:Impingement  Palpation:      Right shoulder tenderness present at: Infraspinatus and Bicipital Groove                                     General     ROS    Assessment/Plan:    Patient is a 89 year old female with right side shoulder complaints.    Patient has the following significant findings with corresponding treatment plan.                Diagnosis 1:  Right shoulder pain  Pain -  hot/cold therapy, US, manual therapy, self management, education and home program  Decreased ROM/flexibility - manual therapy, therapeutic exercise, therapeutic activity and home program  Decreased strength - therapeutic exercise, therapeutic activities and home program  Impaired muscle performance - neuro re-education and home program  Decreased function - therapeutic activities and home program    Therapy Evaluation Codes:   1) History comprised of:   Personal factors that impact the plan of care:      Age and Time since onset of symptoms.    Comorbidity factors that impact the plan of care are:      Depression, Heart problems and High blood pressure.     Medications impacting care: Anti-depressant, Bone density, Cardiac, High blood pressure and Pain.  2) Examination of Body Systems comprised of:   Body structures and functions that impact  the plan of care:      Shoulder.   Activity limitations that impact the plan of care are:      Bathing, Cooking, Dressing and Lifting.  3) Clinical presentation characteristics are:   Evolving/Changing.  4) Decision-Making    Moderate complexity using standardized patient assessment instrument and/or measureable assessment of functional outcome.  Cumulative Therapy Evaluation is: Moderate complexity.    Previous and current functional limitations:  (See Goal Flow Sheet for this information)    Short term and Long term goals: (See Goal Flow Sheet for this information)     Communication ability:  Patient appears to be able to clearly communicate and understand verbal and written communication and follow directions correctly.  Treatment Explanation - The following has been discussed with the patient:   RX ordered/plan of care  Anticipated outcomes  Possible risks and side effects  This patient would benefit from PT intervention to resume normal activities.   Rehab potential is good.    Frequency:  1 X week, once daily  Duration:  for 8 weeks  Discharge Plan:  Achieve all LTG.  Independent in home treatment program.  Reach maximal therapeutic benefit.    Please refer to the daily flowsheet for treatment today, total treatment time and time spent performing 1:1 timed codes.

## 2023-03-09 NOTE — PROGRESS NOTES
[facility]    OUTPATIENT Physical Therapy ORTHOPEDIC EVALUATION  PLAN OF TREATMENT FOR OUTPATIENT REHABILITATION  (COMPLETE FOR INITIAL CLAIMS ONLY)  Patient's Last Name, First Name, M.I.  YOB: 1932  Carrol Diane s Name:  [facility]   Medical Record No.  [id number]   Start of Care Date:  03/09/23   Onset Date:   02/22/23 (MD marte)   Treatment Diagnosis:  R shoulder pain Medical Diagnosis:     Adhesive capsulitis of right shoulder  Primary osteoarthritis of right shoulder  Chronic right shoulder pain  Shoulder pain, right       Goals:     03/09/23 0500   Body Part   Goals listed below are for R shoulder   Goal #1   Goal #1 reaching   Previous Functional Level No restrictions   Current Functional Level Cannot reach ;to shoulder level   Performance level pain up to 9/10   STG Target Performance Reach ;to shoulder level   Performance level pain at worst 7/10   Rationale for independent personal hygiene;for dressing;for bathing;for meal preparation;for safe driving, hook seat belt, reach shift lever and turn signal, using both hands on steering wheel   Due date 03/30/23   LTG Target Performance Reach;to shoulder level   Performance Level pain at worst 3/10   Rationale for independent personal hygiene;for dressing;for bathing;for meal preparation;for safe driving, hook seat belt, reach shift lever and turn signal, using both hands on steering wheel   Due date 05/04/23         Therapy Frequency:  1 x a week  Predicted Duration of Therapy Intervention:  8 weeks    Chas Roca, PT                 I CERTIFY THE NEED FOR THESE SERVICES FURNISHED UNDER        THIS PLAN OF TREATMENT AND WHILE UNDER MY CARE .             Physician Signature               Date    X_____________________________________________________                         Certification Date From:   03/09/23   Certification Date To:  05/04/23    Referring Provider:  Niya Haynes    Initial Assessment        See Epic Evaluation SOC Date: 03/09/23

## 2023-03-17 ENCOUNTER — THERAPY VISIT (OUTPATIENT)
Dept: PHYSICAL THERAPY | Facility: CLINIC | Age: 88
End: 2023-03-17
Payer: MEDICARE

## 2023-03-17 DIAGNOSIS — M25.511 SHOULDER PAIN, RIGHT: Primary | ICD-10-CM

## 2023-03-17 PROCEDURE — 97110 THERAPEUTIC EXERCISES: CPT | Mod: GP | Performed by: PHYSICAL THERAPIST

## 2023-03-24 ENCOUNTER — THERAPY VISIT (OUTPATIENT)
Dept: PHYSICAL THERAPY | Facility: CLINIC | Age: 88
End: 2023-03-24
Payer: MEDICARE

## 2023-03-24 DIAGNOSIS — M25.511 SHOULDER PAIN, RIGHT: Primary | ICD-10-CM

## 2023-03-24 PROCEDURE — 97110 THERAPEUTIC EXERCISES: CPT | Mod: GP | Performed by: PHYSICAL THERAPIST

## 2023-05-05 NOTE — PROGRESS NOTES
Subjective:  HPI  Physical Exam                    Objective:  System    Physical Exam    General     ROS    Assessment/Plan:    DISCHARGE REPORT    Progress reporting period is from 3/9/23 to 3/24/23.       SUBJECTIVE  Subjective changes noted by patient:  Patient states after she started the two new exercises she started to get more pain in the shoulder. States she tried them for a couple days and the pain incresed. She has stopped the exercises that were added at last visit. Current pain level is   Changes in function:  Yes (See Goal flowsheet attached for changes in current functional level)    ASSESSMENT/PLAN  Updated problem list and treatment plan: Diagnosis 1:  Right shoulder pain  Pain -  home program  Decreased ROM/flexibility - home program  Decreased strength - home program  STG/LTGs have been met or progress has been made towards goals:  Yes (See Goal flow sheet completed today.)  Assessment of Progress: The patient's condition is improving.  Self Management Plans:  Patient has been instructed in a home treatment program.  Patient is independent in a home treatment program.  Patient  has been instructed in self management of symptoms.  Patient is independent in self management of symptoms.  I have re-evaluated this patient and find that the nature, scope, duration and intensity of the therapy is appropriate for the medical condition of the patient.  Carrol continues to require the following intervention to meet STG and LTG's:  PT intervention is no longer required to meet STG/LTG.    Recommendations:  This patient is ready to be discharged from therapy and continue their home treatment program.    Please refer to the daily flowsheet for treatment today, total treatment time and time spent performing 1:1 timed codes.